# Patient Record
Sex: MALE | Race: WHITE | URBAN - METROPOLITAN AREA
[De-identification: names, ages, dates, MRNs, and addresses within clinical notes are randomized per-mention and may not be internally consistent; named-entity substitution may affect disease eponyms.]

---

## 2021-09-15 ENCOUNTER — HOSPITAL ENCOUNTER (OUTPATIENT)
Dept: OCCUPATIONAL MEDICINE | Age: 41
Discharge: HOME | End: 2021-09-15

## 2021-09-15 ENCOUNTER — TRANSCRIBE ORDER (OUTPATIENT)
Dept: OCCUPATIONAL MEDICINE | Age: 41
End: 2021-09-15

## 2021-09-15 DIAGNOSIS — Z00.8 HEALTH EXAMINATION IN POPULATION SURVEY: ICD-10-CM

## 2021-09-15 DIAGNOSIS — Z00.8 HEALTH EXAMINATION IN POPULATION SURVEY: Primary | ICD-10-CM

## 2022-06-21 ENCOUNTER — OFFICE VISIT (OUTPATIENT)
Dept: FAMILY MEDICINE CLINIC | Facility: CLINIC | Age: 42
End: 2022-06-21
Payer: MEDICARE

## 2022-06-21 VITALS
HEART RATE: 73 BPM | SYSTOLIC BLOOD PRESSURE: 152 MMHG | DIASTOLIC BLOOD PRESSURE: 76 MMHG | BODY MASS INDEX: 42.66 KG/M2 | OXYGEN SATURATION: 96 % | WEIGHT: 315 LBS | TEMPERATURE: 97.5 F | HEIGHT: 72 IN | RESPIRATION RATE: 18 BRPM

## 2022-06-21 DIAGNOSIS — E78.5 HYPERLIPIDEMIA, UNSPECIFIED HYPERLIPIDEMIA TYPE: ICD-10-CM

## 2022-06-21 DIAGNOSIS — E11.69 TYPE 2 DIABETES MELLITUS WITH OTHER SPECIFIED COMPLICATION, WITHOUT LONG-TERM CURRENT USE OF INSULIN (HCC): ICD-10-CM

## 2022-06-21 DIAGNOSIS — I10 HYPERTENSION, UNSPECIFIED TYPE: ICD-10-CM

## 2022-06-21 DIAGNOSIS — F25.0 SCHIZOAFFECTIVE DISORDER, BIPOLAR TYPE (HCC): ICD-10-CM

## 2022-06-21 DIAGNOSIS — G44.209 TENSION-TYPE HEADACHE, NOT INTRACTABLE, UNSPECIFIED CHRONICITY PATTERN: ICD-10-CM

## 2022-06-21 DIAGNOSIS — E03.9 HYPOTHYROIDISM (ACQUIRED): ICD-10-CM

## 2022-06-21 DIAGNOSIS — G47.33 OSA (OBSTRUCTIVE SLEEP APNEA): ICD-10-CM

## 2022-06-21 DIAGNOSIS — E03.9 HYPOTHYROIDISM (ACQUIRED): Primary | ICD-10-CM

## 2022-06-21 DIAGNOSIS — M15.9 OSTEOARTHRITIS OF MULTIPLE JOINTS, UNSPECIFIED OSTEOARTHRITIS TYPE: ICD-10-CM

## 2022-06-21 PROBLEM — M19.90 OA (OSTEOARTHRITIS): Status: ACTIVE | Noted: 2022-06-21

## 2022-06-21 PROBLEM — E11.9 DM2 (DIABETES MELLITUS, TYPE 2) (HCC): Status: ACTIVE | Noted: 2022-06-21

## 2022-06-21 LAB
ALBUMIN SERPL-MCNC: 3.9 G/DL (ref 3.5–5)
ALBUMIN/GLOB SERPL: 1.1 {RATIO} (ref 1.2–3.5)
ALP SERPL-CCNC: 106 U/L (ref 50–136)
ALT SERPL-CCNC: 40 U/L (ref 12–65)
ANION GAP SERPL CALC-SCNC: 7 MMOL/L (ref 7–16)
AST SERPL-CCNC: 18 U/L (ref 15–37)
BILIRUB SERPL-MCNC: 0.5 MG/DL (ref 0.2–1.1)
BUN SERPL-MCNC: 13 MG/DL (ref 6–23)
CALCIUM SERPL-MCNC: 9.6 MG/DL (ref 8.3–10.4)
CHLORIDE SERPL-SCNC: 107 MMOL/L (ref 98–107)
CHOLEST SERPL-MCNC: 151 MG/DL
CO2 SERPL-SCNC: 29 MMOL/L (ref 21–32)
CREAT SERPL-MCNC: 1.1 MG/DL (ref 0.8–1.5)
GLOBULIN SER CALC-MCNC: 3.6 G/DL (ref 2.3–3.5)
GLUCOSE SERPL-MCNC: 98 MG/DL (ref 65–100)
HDLC SERPL-MCNC: 37 MG/DL (ref 40–60)
HDLC SERPL: 4.1 {RATIO}
LDLC SERPL CALC-MCNC: 75.2 MG/DL
POTASSIUM SERPL-SCNC: 4.3 MMOL/L (ref 3.5–5.1)
PROT SERPL-MCNC: 7.5 G/DL (ref 6.3–8.2)
SODIUM SERPL-SCNC: 143 MMOL/L (ref 138–145)
T4 FREE SERPL-MCNC: 0.9 NG/DL (ref 0.78–1.46)
TRIGL SERPL-MCNC: 194 MG/DL (ref 35–150)
TSH, 3RD GENERATION: 4.01 UIU/ML (ref 0.36–3.74)
VLDLC SERPL CALC-MCNC: 38.8 MG/DL (ref 6–23)

## 2022-06-21 PROCEDURE — 99204 OFFICE O/P NEW MOD 45 MIN: CPT | Performed by: FAMILY MEDICINE

## 2022-06-21 PROCEDURE — 2022F DILAT RTA XM EVC RTNOPTHY: CPT | Performed by: FAMILY MEDICINE

## 2022-06-21 PROCEDURE — G8417 CALC BMI ABV UP PARAM F/U: HCPCS | Performed by: FAMILY MEDICINE

## 2022-06-21 PROCEDURE — G8427 DOCREV CUR MEDS BY ELIG CLIN: HCPCS | Performed by: FAMILY MEDICINE

## 2022-06-21 PROCEDURE — 3046F HEMOGLOBIN A1C LEVEL >9.0%: CPT | Performed by: FAMILY MEDICINE

## 2022-06-21 PROCEDURE — 1036F TOBACCO NON-USER: CPT | Performed by: FAMILY MEDICINE

## 2022-06-21 RX ORDER — LISINOPRIL 20 MG/1
20 TABLET ORAL DAILY
Qty: 90 TABLET | Refills: 0 | Status: SHIPPED | OUTPATIENT
Start: 2022-06-21 | End: 2022-09-21 | Stop reason: SDUPTHER

## 2022-06-21 RX ORDER — ATORVASTATIN CALCIUM 40 MG/1
40 TABLET, FILM COATED ORAL NIGHTLY
Qty: 90 TABLET | Refills: 0 | Status: SHIPPED | OUTPATIENT
Start: 2022-06-21 | End: 2022-09-21 | Stop reason: SDUPTHER

## 2022-06-21 RX ORDER — OLANZAPINE 10 MG/1
10 TABLET ORAL NIGHTLY
COMMUNITY

## 2022-06-21 RX ORDER — TIZANIDINE 2 MG/1
2 TABLET ORAL 2 TIMES DAILY
COMMUNITY
End: 2022-06-21 | Stop reason: SDUPTHER

## 2022-06-21 RX ORDER — TIZANIDINE 2 MG/1
2 TABLET ORAL 2 TIMES DAILY
Qty: 180 TABLET | Refills: 0 | Status: SHIPPED | OUTPATIENT
Start: 2022-06-21 | End: 2022-09-21 | Stop reason: SDUPTHER

## 2022-06-21 RX ORDER — LEVOTHYROXINE SODIUM 0.05 MG/1
50 TABLET ORAL DAILY
COMMUNITY
End: 2022-06-21 | Stop reason: SDUPTHER

## 2022-06-21 RX ORDER — IBUPROFEN 800 MG/1
800 TABLET ORAL 3 TIMES DAILY
COMMUNITY
End: 2022-11-02

## 2022-06-21 RX ORDER — LEVOTHYROXINE SODIUM 0.05 MG/1
50 TABLET ORAL DAILY
Qty: 90 TABLET | Refills: 0 | Status: SHIPPED | OUTPATIENT
Start: 2022-06-21 | End: 2022-09-21 | Stop reason: SDUPTHER

## 2022-06-21 RX ORDER — AMLODIPINE BESYLATE 10 MG/1
10 TABLET ORAL DAILY
COMMUNITY
End: 2022-06-21 | Stop reason: SDUPTHER

## 2022-06-21 RX ORDER — ATORVASTATIN CALCIUM 80 MG/1
80 TABLET, FILM COATED ORAL DAILY
COMMUNITY
End: 2022-06-21 | Stop reason: SDUPTHER

## 2022-06-21 RX ORDER — CITALOPRAM 40 MG/1
40 TABLET ORAL DAILY
COMMUNITY

## 2022-06-21 RX ORDER — LORATADINE 10 MG/1
10 TABLET ORAL DAILY
COMMUNITY

## 2022-06-21 RX ORDER — LITHIUM CARBONATE 300 MG/1
600 CAPSULE ORAL 2 TIMES DAILY WITH MEALS
COMMUNITY

## 2022-06-21 RX ORDER — AMLODIPINE BESYLATE 10 MG/1
10 TABLET ORAL DAILY
Qty: 90 TABLET | Refills: 0 | Status: SHIPPED | OUTPATIENT
Start: 2022-06-21 | End: 2022-09-21 | Stop reason: SDUPTHER

## 2022-06-21 RX ORDER — LISINOPRIL 20 MG/1
20 TABLET ORAL DAILY
COMMUNITY
End: 2022-06-21 | Stop reason: SDUPTHER

## 2022-06-21 NOTE — ASSESSMENT & PLAN NOTE
Problem and/or Symptoms are currently not stable and/or well controlled on current treatment plan. Will have patient follow up as directed and make the following changes for further evaluation and/or treatment:     Discussed with patient proper diet and exercise to include: correct percentage of protein, fat, and carbohydrates for each meal with the focus on a low carbohydrate diet; also regular cardiovascular and strength training exercise most days of the week. All questions answered and will follow up weight and adherence to lifestyle modifications at next visit.     Also referring for diabetic nutritional counseling

## 2022-06-21 NOTE — PROGRESS NOTES
[address]  Phone: [phone]  Fax: [phone]  [email or web address]      Encounter Info  Tess Pinto; New patient 35 y.o.male; seen 6/21/2022 for: Establish Care, Hypertension, Cholesterol Problem, Hypothyroidism, Diabetes, Headache (tension headaches takes tizanidine), and Sleep Apnea (needs new machine)      Assessment & Plan    1. Hypothyroidism (acquired)  Assessment & Plan:  Problem and/or Symptoms are new to provider. Will have patient follow up as directed and make the following plan for further evaluation and/or treatment:    Pertinent labs pending & pertinent meds refilled X 3 M    Orders:  -     TSH; Future  -     T4, Free; Future  2. Type 2 diabetes mellitus with other specified complication, without long-term current use of insulin (HCC)  Assessment & Plan:  Problem and/or Symptoms are new to provider. Will have patient follow up as directed and make the following plan for further evaluation and/or treatment:    Pertinent labs pending & pertinent meds refilled X 3 M  Orders:  -     metFORMIN (GLUCOPHAGE) 500 MG tablet; Take 1 tablet by mouth daily (with breakfast), Disp-180 tablet, R-0Normal  -     Hemoglobin A1C; Future  -     Comprehensive Metabolic Panel; Future  -     [facility]  3. Tension-type headache, not intractable, unspecified chronicity pattern  Assessment & Plan:  Problem and/or Symptoms are new to provider. Will have patient follow up as directed and make the following plan for further evaluation and/or treatment:    Pertinent labs pending & pertinent meds refilled X 3 M    Orders:  -     tiZANidine (ZANAFLEX) 2 MG tablet; Take 1 tablet by mouth 2 times daily, Disp-180 tablet, R-0Normal  4. Osteoarthritis of multiple joints, unspecified osteoarthritis type  Assessment & Plan:  Problem and/or Symptoms are new to provider.  Will have patient follow up as directed and make the following plan for further evaluation and/or treatment:    Pertinent labs pending & pertinent meds refilled X 3 M    5. Hyperlipidemia, unspecified hyperlipidemia type  Assessment & Plan:  Problem and/or Symptoms are new to provider. Will have patient follow up as directed and make the following plan for further evaluation and/or treatment:    Pertinent labs pending & pertinent meds refilled X 3 M    Orders:  -     atorvastatin (LIPITOR) 40 MG tablet; Take 1 tablet by mouth at bedtime, Disp-90 tablet, R-0Normal  -     Comprehensive Metabolic Panel; Future  -     Lipid Panel; Future  6. Schizoaffective disorder, bipolar type [facility])  Assessment & Plan:  Problem is managed by appropriate specialist and patient encouraged to attend all scheduled visits and take all medications as directed. 7. Hypertension, unspecified type  Assessment & Plan:  Problem and/or Symptoms are new to provider. Will have patient follow up as directed and make the following plan for further evaluation and/or treatment:    Problem and/or Symptoms are currently not stable and/or well controlled on current treatment plan. Will have patient follow up as directed and make the following changes for further evaluation and/or treatment:     Advised patient to check home BP BID X 2 wks, then send BP log or average reading to office for review. Will make adjustments to BP Tx regimen then as appropriate. Orders:  -     amLODIPine (NORVASC) 10 MG tablet; Take 1 tablet by mouth daily, Disp-90 tablet, R-0Normal  -     metoprolol tartrate (LOPRESSOR) 25 MG tablet; Take 1 tablet by mouth 2 times daily, Disp-180 tablet, R-0Normal  -     lisinopril (PRINIVIL;ZESTRIL) 20 MG tablet; Take 1 tablet by mouth daily, Disp-90 tablet, R-0Normal  -     Comprehensive Metabolic Panel; Future  8. YENI (obstructive sleep apnea)  Assessment & Plan:  Problem and/or Symptoms are currently not stable and/or well controlled on current treatment plan.  Will have patient follow up as directed and make the following changes for further evaluation and/or treatment:     Referring to Sleep Medicine clinic as pt states it's been well over 10 years since he's had a sleep study or had his machine settings checked  Orders:  -     Amb External Referral To Sleep Medicine  9. BMI 60.0-69.9, adult [facility])  Assessment & Plan:  Problem and/or Symptoms are currently not stable and/or well controlled on current treatment plan. Will have patient follow up as directed and make the following changes for further evaluation and/or treatment:     Discussed with patient proper diet and exercise to include: correct percentage of protein, fat, and carbohydrates for each meal with the focus on a low carbohydrate diet; also regular cardiovascular and strength training exercise most days of the week. All questions answered and will follow up weight and adherence to lifestyle modifications at next visit. Also referring for diabetic nutritional counseling  Orders:  -     [facility]      Check Out Instructions  Return in about 3 months (around 9/21/2022) for Physical, Previsit Labs. Subjective & Objective    HPI  New pt today, sees the [facility] for Psych care but has not really had any primary care for about 3 years. [facility] has [facility] RF his medical problem medications but pt states she's not had any labs for 3+ years. Review of Systems  Physical Exam  Constitutional:       Appearance: He is obese. Cardiovascular:      Rate and Rhythm: Normal rate and regular rhythm. Pulses: Normal pulses. Heart sounds: Normal heart sounds. Pulmonary:      Effort: Pulmonary effort is normal.      Breath sounds: Normal breath sounds.        Vitals:    06/21/22 1408 06/21/22 1413   BP: (!) 168/80 (!) 152/76   Site: Left Lower Arm Right Lower Arm   Position: Sitting Sitting   Cuff Size: Large Adult Large Adult   Pulse: 73 73   Resp: 18    Temp: 97.5 °F (36.4 °C)    TempSrc: Temporal SpO2: 96%    Weight: (!) 510 lb (231.3 kg)    Height: 6' (1.829 m)      BP Readings from Last 3 Encounters:   06/21/22 (!) 152/76     Body mass index is 69.17 kg/m². Wt Readings from Last 3 Encounters:   06/21/22 (!) 510 lb (231.3 kg)     TIME    We discussed the typical prognosis and potential complications of the concern(s), including treatment options. Medication risks, benefits, costs, interactions, and alternatives were discussed as appropriate. I advised him to contact the office if his condition worsens or fails to improve as anticipated. He expressed understanding with the discussion and plan of care. An electronic signature was used to authenticate this note.   -- Lacie Nowak MD

## 2022-06-21 NOTE — ASSESSMENT & PLAN NOTE
Problem and/or Symptoms are currently not stable and/or well controlled on current treatment plan.  Will have patient follow up as directed and make the following changes for further evaluation and/or treatment:     Referring to Sleep Medicine clinic as pt states it's been well over 10 years since he's had a sleep study or had his machine settings checked

## 2022-06-21 NOTE — ASSESSMENT & PLAN NOTE
Problem and/or Symptoms are new to provider.  Will have patient follow up as directed and make the following plan for further evaluation and/or treatment:    Pertinent labs pending & pertinent meds refilled X 3 M

## 2022-06-21 NOTE — ASSESSMENT & PLAN NOTE
Problem and/or Symptoms are new to provider. Will have patient follow up as directed and make the following plan for further evaluation and/or treatment:    Problem and/or Symptoms are currently not stable and/or well controlled on current treatment plan. Will have patient follow up as directed and make the following changes for further evaluation and/or treatment:     Advised patient to check home BP BID X 2 wks, then send BP log or average reading to office for review. Will make adjustments to BP Tx regimen then as appropriate.

## 2022-06-22 LAB
EST. AVERAGE GLUCOSE BLD GHB EST-MCNC: 126 MG/DL
HBA1C MFR BLD: 6 % (ref 4.2–6.3)

## 2022-09-09 DIAGNOSIS — I10 HYPERTENSION, UNSPECIFIED TYPE: ICD-10-CM

## 2022-09-09 DIAGNOSIS — E03.9 HYPOTHYROIDISM (ACQUIRED): ICD-10-CM

## 2022-09-09 DIAGNOSIS — E11.69 TYPE 2 DIABETES MELLITUS WITH OTHER SPECIFIED COMPLICATION, WITHOUT LONG-TERM CURRENT USE OF INSULIN (HCC): ICD-10-CM

## 2022-09-09 DIAGNOSIS — E78.5 HYPERLIPIDEMIA, UNSPECIFIED HYPERLIPIDEMIA TYPE: ICD-10-CM

## 2022-09-15 LAB
ALBUMIN SERPL-MCNC: 3.8 G/DL (ref 3.5–5)
ALBUMIN/GLOB SERPL: 1.2 {RATIO} (ref 1.2–3.5)
ALP SERPL-CCNC: 100 U/L (ref 50–136)
ALT SERPL-CCNC: 30 U/L (ref 12–65)
ANION GAP SERPL CALC-SCNC: 2 MMOL/L (ref 4–13)
AST SERPL-CCNC: 13 U/L (ref 15–37)
BILIRUB SERPL-MCNC: 0.6 MG/DL (ref 0.2–1.1)
BUN SERPL-MCNC: 19 MG/DL (ref 6–23)
CALCIUM SERPL-MCNC: 9.8 MG/DL (ref 8.3–10.4)
CHLORIDE SERPL-SCNC: 109 MMOL/L (ref 101–110)
CHOLEST SERPL-MCNC: 141 MG/DL
CO2 SERPL-SCNC: 28 MMOL/L (ref 21–32)
CREAT SERPL-MCNC: 1.2 MG/DL (ref 0.8–1.5)
EST. AVERAGE GLUCOSE BLD GHB EST-MCNC: 117 MG/DL
GLOBULIN SER CALC-MCNC: 3.2 G/DL (ref 2.3–3.5)
GLUCOSE SERPL-MCNC: 152 MG/DL (ref 65–100)
HBA1C MFR BLD: 5.7 % (ref 4.8–5.6)
HDLC SERPL-MCNC: 33 MG/DL (ref 40–60)
HDLC SERPL: 4.3 {RATIO}
LDLC SERPL CALC-MCNC: 75.4 MG/DL
POTASSIUM SERPL-SCNC: 4.4 MMOL/L (ref 3.5–5.1)
PROT SERPL-MCNC: 7 G/DL (ref 6.3–8.2)
SODIUM SERPL-SCNC: 139 MMOL/L (ref 136–145)
T4 FREE SERPL-MCNC: 0.8 NG/DL (ref 0.78–1.46)
TRIGL SERPL-MCNC: 163 MG/DL (ref 35–150)
TSH, 3RD GENERATION: 3.84 UIU/ML (ref 0.36–3.74)
VLDLC SERPL CALC-MCNC: 32.6 MG/DL (ref 6–23)

## 2022-09-21 ENCOUNTER — OFFICE VISIT (OUTPATIENT)
Dept: FAMILY MEDICINE CLINIC | Facility: CLINIC | Age: 42
End: 2022-09-21
Payer: MEDICARE

## 2022-09-21 VITALS
BODY MASS INDEX: 42.66 KG/M2 | TEMPERATURE: 97.5 F | WEIGHT: 315 LBS | SYSTOLIC BLOOD PRESSURE: 153 MMHG | OXYGEN SATURATION: 95 % | HEIGHT: 72 IN | HEART RATE: 86 BPM | DIASTOLIC BLOOD PRESSURE: 68 MMHG

## 2022-09-21 DIAGNOSIS — G44.209 TENSION-TYPE HEADACHE, NOT INTRACTABLE, UNSPECIFIED CHRONICITY PATTERN: ICD-10-CM

## 2022-09-21 DIAGNOSIS — I10 HYPERTENSION, UNSPECIFIED TYPE: ICD-10-CM

## 2022-09-21 DIAGNOSIS — F25.0 SCHIZOAFFECTIVE DISORDER, BIPOLAR TYPE (HCC): ICD-10-CM

## 2022-09-21 DIAGNOSIS — E03.9 HYPOTHYROIDISM (ACQUIRED): Primary | ICD-10-CM

## 2022-09-21 DIAGNOSIS — E78.5 HYPERLIPIDEMIA, UNSPECIFIED HYPERLIPIDEMIA TYPE: ICD-10-CM

## 2022-09-21 DIAGNOSIS — E11.69 TYPE 2 DIABETES MELLITUS WITH OTHER SPECIFIED COMPLICATION, WITHOUT LONG-TERM CURRENT USE OF INSULIN (HCC): ICD-10-CM

## 2022-09-21 PROCEDURE — 99214 OFFICE O/P EST MOD 30 MIN: CPT | Performed by: FAMILY MEDICINE

## 2022-09-21 PROCEDURE — G8417 CALC BMI ABV UP PARAM F/U: HCPCS | Performed by: FAMILY MEDICINE

## 2022-09-21 PROCEDURE — G8427 DOCREV CUR MEDS BY ELIG CLIN: HCPCS | Performed by: FAMILY MEDICINE

## 2022-09-21 PROCEDURE — 2022F DILAT RTA XM EVC RTNOPTHY: CPT | Performed by: FAMILY MEDICINE

## 2022-09-21 PROCEDURE — 3044F HG A1C LEVEL LT 7.0%: CPT | Performed by: FAMILY MEDICINE

## 2022-09-21 PROCEDURE — 1036F TOBACCO NON-USER: CPT | Performed by: FAMILY MEDICINE

## 2022-09-21 RX ORDER — TIZANIDINE 2 MG/1
2 TABLET ORAL 2 TIMES DAILY
Qty: 180 TABLET | Refills: 1 | Status: SHIPPED | OUTPATIENT
Start: 2022-09-21

## 2022-09-21 RX ORDER — LISINOPRIL 40 MG/1
40 TABLET ORAL DAILY
Qty: 90 TABLET | Refills: 1 | Status: SHIPPED | OUTPATIENT
Start: 2022-09-21 | End: 2022-11-02

## 2022-09-21 RX ORDER — LEVOTHYROXINE SODIUM 0.07 MG/1
75 TABLET ORAL DAILY
Qty: 90 TABLET | Refills: 1 | Status: SHIPPED | OUTPATIENT
Start: 2022-09-21

## 2022-09-21 RX ORDER — AMLODIPINE BESYLATE 10 MG/1
10 TABLET ORAL DAILY
Qty: 90 TABLET | Refills: 1 | Status: SHIPPED | OUTPATIENT
Start: 2022-09-21

## 2022-09-21 RX ORDER — ATORVASTATIN CALCIUM 40 MG/1
40 TABLET, FILM COATED ORAL NIGHTLY
Qty: 90 TABLET | Refills: 1 | Status: SHIPPED | OUTPATIENT
Start: 2022-09-21

## 2022-09-21 NOTE — PROGRESS NOTES
[facility])  Assessment & Plan:  Problem and/or Symptoms are currently stable and/or improving on current treatment plan. Will continue and have patient follow up as directed. Pertinent labs reviewed & pertinent meds refilled X 6 M  Orders:  -     metFORMIN (GLUCOPHAGE) 500 MG tablet; Take 1 tablet by mouth daily (with breakfast), Disp-90 tablet, R-1Normal  5. Hyperlipidemia, unspecified hyperlipidemia type  Assessment & Plan:  Problem and/or Symptoms are currently stable and/or improving on current treatment plan. Will continue and have patient follow up as directed. Pertinent labs reviewed & pertinent meds refilled X 6 M    Orders:  -     atorvastatin (LIPITOR) 40 MG tablet; Take 1 tablet by mouth at bedtime, Disp-90 tablet, R-1Normal  6. BMI 60.0-69.9, adult [facility])  Assessment & Plan:  Discussed with patient proper diet and exercise to include: correct percentage of protein, fat, and carbohydrates for each meal with the focus on a low carbohydrate diet; also regular cardiovascular and strength training exercise most days of the week. All questions answered and will follow up weight and adherence to lifestyle modifications at next visit. 7. Schizoaffective disorder, bipolar type [facility])  Assessment & Plan:  Problem is managed by appropriate specialist and patient encouraged to attend all scheduled visits and take all medications as directed. Check Out Instructions  Return in about 6 months (around 3/21/2023) for Office Visit, Previsit Labs. Subjective & Objective    HPI  Patient states that chronic health problems are stable on current regimens and has no acute concerns today except as mentioned.       Review of Systems    Physical Exam  Vitals:    09/21/22 1349 09/21/22 1352   BP: (!) 131/57 (!) 153/68   Site: Left Lower Arm Right Lower Arm   Position: Sitting Sitting   Cuff Size: Large Adult Large Adult   Pulse: 86    Temp: 97.5 °F (36.4 °C)    TempSrc: Temporal    SpO2: 95%    Weight: (!) 506 lb (229.5 kg)    Height: 6' (1.829 m)      BP Readings from Last 3 Encounters:   09/21/22 (!) 153/68   06/21/22 (!) 152/76     Body mass index is 68.63 kg/m². Wt Readings from Last 3 Encounters:   09/21/22 (!) 506 lb (229.5 kg)   06/21/22 (!) 510 lb (231.3 kg)       We discussed the typical prognosis and potential complications of the concern(s), including treatment options. Medication risks, benefits, costs, interactions, and alternatives were discussed as appropriate. I advised him to contact the office if his condition worsens or fails to improve as anticipated. He expressed understanding with the discussion and plan of care. An electronic signature was used to authenticate this note.   -- Haley Yoo MD

## 2022-09-21 NOTE — ASSESSMENT & PLAN NOTE
Problem and/or Symptoms are currently not stable and/or well controlled on current treatment plan.  Will have patient follow up as directed and make the following changes for further evaluation and/or treatment:     Cont norvasc 10 mg QD & increasing Lisinopril from 20 to 40 mg QD

## 2022-09-21 NOTE — ASSESSMENT & PLAN NOTE
Discussed with patient proper diet and exercise to include: correct percentage of protein, fat, and carbohydrates for each meal with the focus on a low carbohydrate diet; also regular cardiovascular and strength training exercise most days of the week. All questions answered and will follow up weight and adherence to lifestyle modifications at next visit.

## 2022-10-22 ENCOUNTER — PATIENT MESSAGE (OUTPATIENT)
Dept: FAMILY MEDICINE CLINIC | Facility: CLINIC | Age: 42
End: 2022-10-22

## 2022-10-24 NOTE — TELEPHONE ENCOUNTER
From: Jono Cagle  To: Dr. Dolores Mcknight: 10/22/2022 4:56 PM EDT  Subject: Sleep apnea     Hi I was treated at [facility] sleep clinic do get a new machine.  I never heard back from them and would like you to refer me to a different sleep clinic

## 2022-11-02 ENCOUNTER — HOSPITAL ENCOUNTER (EMERGENCY)
Age: 42
Discharge: HOME | End: 2022-11-03
Attending: EMERGENCY MEDICINE
Payer: MEDICARE

## 2022-11-02 DIAGNOSIS — N28.9 RENAL INSUFFICIENCY: Primary | ICD-10-CM

## 2022-11-02 LAB
ALBUMIN SERPL-MCNC: 3.7 G/DL (ref 3.5–5)
ALBUMIN/GLOB SERPL: 1.1 {RATIO} (ref 0.4–1.6)
ALP SERPL-CCNC: 105 U/L (ref 50–136)
ALT SERPL-CCNC: 40 U/L (ref 12–65)
ANION GAP SERPL CALC-SCNC: 5 MMOL/L (ref 2–11)
AST SERPL-CCNC: 14 U/L (ref 15–37)
BASOPHILS # BLD: 0.1 K/UL (ref 0–0.2)
BASOPHILS NFR BLD: 1 % (ref 0–2)
BILIRUB SERPL-MCNC: 0.5 MG/DL (ref 0.2–1.1)
BILIRUB UR QL: NEGATIVE
BUN SERPL-MCNC: 41 MG/DL (ref 6–23)
CALCIUM SERPL-MCNC: 9.5 MG/DL (ref 8.3–10.4)
CHLORIDE SERPL-SCNC: 113 MMOL/L (ref 101–110)
CO2 SERPL-SCNC: 22 MMOL/L (ref 21–32)
CREAT SERPL-MCNC: 2.2 MG/DL (ref 0.8–1.5)
DIFFERENTIAL METHOD BLD: ABNORMAL
EOSINOPHIL # BLD: 0.4 K/UL (ref 0–0.8)
EOSINOPHIL NFR BLD: 3 % (ref 0.5–7.8)
ERYTHROCYTE [DISTWIDTH] IN BLOOD BY AUTOMATED COUNT: 14.7 % (ref 11.9–14.6)
GLOBULIN SER CALC-MCNC: 3.5 G/DL (ref 2.8–4.5)
GLUCOSE SERPL-MCNC: 111 MG/DL (ref 65–100)
GLUCOSE UR QL STRIP.AUTO: NEGATIVE MG/DL
HCT VFR BLD AUTO: 36.8 % (ref 41.1–50.3)
HGB BLD-MCNC: 11.4 G/DL (ref 13.6–17.2)
IMM GRANULOCYTES # BLD AUTO: 0 K/UL (ref 0–0.5)
IMM GRANULOCYTES NFR BLD AUTO: 0 % (ref 0–5)
KETONES UR-MCNC: NEGATIVE MG/DL
LEUKOCYTE ESTERASE UR QL STRIP: NEGATIVE
LYMPHOCYTES # BLD: 3 K/UL (ref 0.5–4.6)
LYMPHOCYTES NFR BLD: 22 % (ref 13–44)
MCH RBC QN AUTO: 29.8 PG (ref 26.1–32.9)
MCHC RBC AUTO-ENTMCNC: 31 G/DL (ref 31.4–35)
MCV RBC AUTO: 96.1 FL (ref 82–102)
MONOCYTES # BLD: 1.1 K/UL (ref 0.1–1.3)
MONOCYTES NFR BLD: 8 % (ref 4–12)
NEUTS SEG # BLD: 9.3 K/UL (ref 1.7–8.2)
NEUTS SEG NFR BLD: 66 % (ref 43–78)
NITRITE UR QL: NEGATIVE
NRBC # BLD: 0 K/UL (ref 0–0.2)
PH UR: 6 [PH] (ref 5–9)
PLATELET # BLD AUTO: 292 K/UL (ref 150–450)
PMV BLD AUTO: 11.4 FL (ref 9.4–12.3)
POTASSIUM SERPL-SCNC: 5.2 MMOL/L (ref 3.5–5.1)
PROT SERPL-MCNC: 7.2 G/DL (ref 6.3–8.2)
PROT UR QL: NEGATIVE MG/DL
RBC # BLD AUTO: 3.83 M/UL (ref 4.23–5.6)
RBC # UR STRIP: NEGATIVE /UL
SERVICE CMNT-IMP: ABNORMAL
SODIUM SERPL-SCNC: 140 MMOL/L (ref 133–143)
SP GR UR: >1.03 (ref 1–1.02)
UROBILINOGEN UR QL: 0.2 EU/DL (ref 0.2–1)
WBC # BLD AUTO: 13.9 K/UL (ref 4.3–11.1)

## 2022-11-02 PROCEDURE — 96360 HYDRATION IV INFUSION INIT: CPT

## 2022-11-02 PROCEDURE — 80053 COMPREHEN METABOLIC PANEL: CPT

## 2022-11-02 PROCEDURE — 85025 COMPLETE CBC W/AUTO DIFF WBC: CPT

## 2022-11-02 PROCEDURE — 80178 ASSAY OF LITHIUM: CPT

## 2022-11-02 PROCEDURE — 96361 HYDRATE IV INFUSION ADD-ON: CPT

## 2022-11-02 PROCEDURE — 99284 EMERGENCY DEPT VISIT MOD MDM: CPT

## 2022-11-02 PROCEDURE — 81003 URINALYSIS AUTO W/O SCOPE: CPT

## 2022-11-02 PROCEDURE — 2580000003 HC RX 258: Performed by: EMERGENCY MEDICINE

## 2022-11-02 RX ORDER — 0.9 % SODIUM CHLORIDE 0.9 %
1000 INTRAVENOUS SOLUTION INTRAVENOUS ONCE
Status: COMPLETED | OUTPATIENT
Start: 2022-11-02 | End: 2022-11-03

## 2022-11-02 RX ADMIN — SODIUM CHLORIDE 1000 ML: 900 INJECTION, SOLUTION INTRAVENOUS at 20:57

## 2022-11-02 ASSESSMENT — ENCOUNTER SYMPTOMS
SHORTNESS OF BREATH: 0
NAUSEA: 0
ABDOMINAL PAIN: 0
VOMITING: 0
COUGH: 0
DIARRHEA: 0
BACK PAIN: 0

## 2022-11-02 ASSESSMENT — PAIN - FUNCTIONAL ASSESSMENT: PAIN_FUNCTIONAL_ASSESSMENT: NONE - DENIES PAIN

## 2022-11-02 NOTE — ED TRIAGE NOTES
Pt arrives from home via POV. Was seen at [place] and labs were drawn. Got a call and advised to come to ED for evaluation of Kidney Function. Creatnine 2.7. Pt has had 3-4 UTIs in the last six weeks.   Denies abdominal pain, n/v/d

## 2022-11-03 VITALS
HEIGHT: 72 IN | RESPIRATION RATE: 20 BRPM | BODY MASS INDEX: 42.66 KG/M2 | SYSTOLIC BLOOD PRESSURE: 163 MMHG | OXYGEN SATURATION: 96 % | HEART RATE: 87 BPM | DIASTOLIC BLOOD PRESSURE: 59 MMHG | TEMPERATURE: 98.7 F | WEIGHT: 315 LBS

## 2022-11-03 LAB — LITHIUM SERPL-SCNC: 1 MMOL/L (ref 0.6–1.2)

## 2022-11-03 ASSESSMENT — PAIN - FUNCTIONAL ASSESSMENT: PAIN_FUNCTIONAL_ASSESSMENT: NONE - DENIES PAIN

## 2022-11-03 NOTE — ED NOTES
I have reviewed discharge instructions with the pt and pt family member at bedside. The pt and pt family member verbalized understanding. Patient left ED via POV to home with family member. Opportunity for questions and clarification provided. Patient given 0 scripts. To continue your aftercare when you leave the hospital, you may receive an automated call from our care team to check in on how you are doing. This is a free service and part of our promise to provide the best care and service to meet your aftercare needs.  If you have questions, or wish to unsubscribe from this service please call [phone]. Thank you for Choosing our [employer] Emergency Department.         Lucy Simmons RN  11/03/22 5869

## 2022-11-03 NOTE — DISCHARGE INSTRUCTIONS
You must follow-up with nephrology for further evaluation as soon as possible. Avoiding medications that are toxic to the kidneys such as NSAIDs. Talk to your psychiatrist immediately about reducing your lithium dose due to your abnormal kidney function. Return for any worsening or concerning symptoms.

## 2022-11-03 NOTE — ED PROVIDER NOTES
[facility] Emergency Department Provider Note                   PCP:                David Lay MD               Age: 35 y.o. Sex: male       Angela Domingo is a 35 y.o. male who presents to the Emergency Department with chief complaint of    Chief Complaint   Patient presents with    Abnormal Lab      Patient states that last week he had labs drawn and his creatinine was elevated. It was rechecked today by the [place] and was told his creatinine was 2.7. Patient denies any history of kidney problem. Patient states that he has been urinating normally. He states that he feels well otherwise. He states he had 3-4 urinary tract infections over the last 6 weeks but no UTI symptoms now. The history is provided by the patient. All other systems reviewed and are negative. Review of Systems   Constitutional:  Negative for chills, fatigue and fever. Eyes:  Negative for visual disturbance. Respiratory:  Negative for cough and shortness of breath. Cardiovascular:  Negative for chest pain and leg swelling. Gastrointestinal:  Negative for abdominal pain, diarrhea, nausea and vomiting. Genitourinary:  Negative for decreased urine volume, difficulty urinating, dysuria and hematuria. Musculoskeletal:  Negative for back pain. Neurological:  Negative for dizziness and headaches.      Past Medical History:   Diagnosis Date    Diabetes mellitus ([address]     Hyperlipidemia     Hypertension     Schizo-affective psychosis [address] [address]     with major depression    Thyroid disease         Past Surgical History:   Procedure Laterality Date    ANKLE SURGERY Left 2014    4 left ankle surgeries, ankle fusion        Family History   Problem Relation Age of Onset    High Blood Pressure Mother     Alcohol Abuse Father         Social History     Socioeconomic History    Marital status: Single     Spouse name: None    Number of children: None    Years of education: None    Highest education level: None   Tobacco Use Smoking status: Former     Years: 15.00     Types: Cigarettes    Smokeless tobacco: Never   Vaping Use    Vaping Use: Never used   Substance and Sexual Activity    Alcohol use: Not Currently    Drug use: Never        Allergies: Patient has no known allergies.     Current Discharge Medication List        CONTINUE these medications which have NOT CHANGED    Details   NALTREXONE-BUPROPION HCL ER PO Take by mouth in the morning, at noon, and at bedtime Unknown mg      metoprolol tartrate (LOPRESSOR) 25 MG tablet Take 1 tablet by mouth 2 times daily  Qty: 180 tablet, Refills: 1    Associated Diagnoses: Hypertension, unspecified type      metFORMIN (GLUCOPHAGE) 500 MG tablet Take 1 tablet by mouth daily (with breakfast)  Qty: 90 tablet, Refills: 1    Associated Diagnoses: Type 2 diabetes mellitus with other specified complication, without long-term current use of insulin (HCC)      levothyroxine (SYNTHROID) 75 MCG tablet Take 1 tablet by mouth daily  Qty: 90 tablet, Refills: 1    Associated Diagnoses: Hypothyroidism (acquired)      atorvastatin (LIPITOR) 40 MG tablet Take 1 tablet by mouth at bedtime  Qty: 90 tablet, Refills: 1    Associated Diagnoses: Hyperlipidemia, unspecified hyperlipidemia type      amLODIPine (NORVASC) 10 MG tablet Take 1 tablet by mouth daily  Qty: 90 tablet, Refills: 1    Associated Diagnoses: Hypertension, unspecified type      tiZANidine (ZANAFLEX) 2 MG tablet Take 1 tablet by mouth 2 times daily  Qty: 180 tablet, Refills: 1    Associated Diagnoses: Tension-type headache, not intractable, unspecified chronicity pattern      OLANZapine (ZYPREXA) 10 MG tablet Take 10 mg by mouth nightly      Acetaminophen 500 MG CAPS Take 1,500 mg by mouth      citalopram (CELEXA) 40 MG tablet Take 40 mg by mouth daily      loratadine (CLARITIN) 10 MG tablet Take 10 mg by mouth daily      lithium 300 MG capsule Take 600 mg by mouth 2 times daily (with meals) Take two capsules two times a day              Vitals signs and nursing note reviewed. No data found. Physical Exam  Vitals and nursing note reviewed. Constitutional:       Appearance: He is obese. HENT:      Head: Normocephalic and atraumatic. Cardiovascular:      Rate and Rhythm: Normal rate and regular rhythm. Pulses: Normal pulses. Heart sounds: Normal heart sounds. Pulmonary:      Effort: Pulmonary effort is normal.      Breath sounds: Normal breath sounds. Abdominal:      General: Abdomen is flat. Bowel sounds are normal.      Palpations: Abdomen is soft. Tenderness: There is no abdominal tenderness. Musculoskeletal:         General: No swelling or tenderness. Cervical back: Normal range of motion and neck supple. No tenderness. Skin:     General: Skin is warm and dry. Neurological:      General: No focal deficit present. Mental Status: He is alert and oriented to person, place, and time.    Psychiatric:         Behavior: Behavior normal.        Orders Placed This Encounter   Procedures    CBC with Auto Differential    Comprehensive Metabolic Panel    Lithium Level    POCT Urinalysis no Micro    POCT Urinalysis no Micro       Procedures        Results Include:    Recent Results (from the past 24 hour(s))   CBC with Auto Differential    Collection Time: 11/02/22  3:58 PM   Result Value Ref Range    WBC 13.9 (H) 4.3 - 11.1 K/uL    RBC 3.83 (L) 4.23 - 5.6 M/uL    Hemoglobin 11.4 (L) 13.6 - 17.2 g/dL    Hematocrit 36.8 (L) 41.1 - 50.3 %    MCV 96.1 82 - 102 FL    MCH 29.8 26.1 - 32.9 PG    MCHC 31.0 (L) 31.4 - 35.0 g/dL    RDW 14.7 (H) 11.9 - 14.6 %    Platelets 294 397 - 179 K/uL    MPV 11.4 9.4 - 12.3 FL    nRBC 0.00 0.0 - 0.2 K/uL    Differential Type AUTOMATED      Seg Neutrophils 66 43 - 78 %    Lymphocytes 22 13 - 44 %    Monocytes 8 4.0 - 12.0 %    Eosinophils % 3 0.5 - 7.8 %    Basophils 1 0.0 - 2.0 %    Immature Granulocytes 0 0.0 - 5.0 %    Segs Absolute 9.3 (H) 1.7 - 8.2 K/UL    Absolute Lymph # 3.0 0.5 - 4.6 K/UL    Absolute Mono # 1.1 0.1 - 1.3 K/UL    Absolute Eos # 0.4 0.0 - 0.8 K/UL    Basophils Absolute 0.1 0.0 - 0.2 K/UL    Absolute Immature Granulocyte 0.0 0.0 - 0.5 K/UL   Comprehensive Metabolic Panel    Collection Time: 11/02/22  3:58 PM   Result Value Ref Range    Sodium 140 133 - 143 mmol/L    Potassium 5.2 (H) 3.5 - 5.1 mmol/L    Chloride 113 (H) 101 - 110 mmol/L    CO2 22 21 - 32 mmol/L    Anion Gap 5 2 - 11 mmol/L    Glucose 111 (H) 65 - 100 mg/dL    BUN 41 (H) 6 - 23 MG/DL    Creatinine 2.20 (H) 0.8 - 1.5 MG/DL    Est, Glom Filt Rate 37 (L) >60 ml/min/1.73m2    Calcium 9.5 8.3 - 10.4 MG/DL    Total Bilirubin 0.5 0.2 - 1.1 MG/DL    ALT 40 12 - 65 U/L    AST 14 (L) 15 - 37 U/L    Alk Phosphatase 105 50 - 136 U/L    Total Protein 7.2 6.3 - 8.2 g/dL    Albumin 3.7 3.5 - 5.0 g/dL    Globulin 3.5 2.8 - 4.5 g/dL    Albumin/Globulin Ratio 1.1 0.4 - 1.6     POCT Urinalysis no Micro    Collection Time: 11/02/22  7:52 PM   Result Value Ref Range    Specific Gravity, Urine, POC >1.030 (H) 1.001 - 1.023    pH, Urine, POC 6.0 5.0 - 9.0      Protein, Urine, POC Negative NEG mg/dL    Glucose, UA POC Negative NEG mg/dL    Ketones, Urine, POC Negative NEG mg/dL    Bilirubin, Urine, POC Negative NEG      Blood, UA POC Negative NEG      URINE UROBILINOGEN POC 0.2 0.2 - 1.0 EU/dL    Nitrate, Urine, POC Negative NEG      Leukocyte Est, UA POC Negative NEG      Performed by: Paola Dotson           No orders to display                      ED Course as of 11/02/22 2324 Wed Nov 02, 2022 2143 Timo Monaco of hospitalist.  She recommended that patient can follow-up with nephrology as outpatient. She did recommend patient's stop any ACE or NSAID medications he is on. [CW]   2322 Nursing has had to redraw patient's lithium level because apparently the lab has the wrong tube. I did update the patient on the results and plan.  [CW]      ED Course User Index  [CW] Felicitas Stephens MD       MDM  Number of Diagnoses or Management Options  Renal insufficiency  Diagnosis management comments: Patient presented for evaluation of elevated kidney enzymes. Patient states that he has been urinating normally recently. Patient's BUN and creatinine were mildly elevated today in the ED. Patient's potassium is barely above normal.  Patient's urinalysis is negative for infection. Patient was given IV fluid in the ED. Patient is asymptomatic and feels well. I did consult with hospitalist who felt that patient can follow-up with nephrologist as outpatient. Patient is on lisinopril which I am stopping. He has ibuprofen listed as a medication but states he is not taking it and I explained to him that he needs to avoid NSAIDs. Patient is on Librium but has been holding it for the past 4 days because of his lab check. I have ordered a Librium level which is pending. Unless it is elevated, the plan is to discharge patient home with outpatient nephrology follow-up and lower his lithium dose. If his lithium level is elevated then the plan is to admit patient. I have signed out patient to Dr. Charles Bueno. We have reviewed patient's presentation, history, physical, PMH, testing so far, treatments so far, and pending testing plus disposition plan. ICD-10-CM    1. Renal insufficiency  N28.9           DISPOSITION         Voice dictation software was used during the making of this note. This software is not perfect and grammatical and other typographical errors may be present. This note has not been completely proofread for errors.      Ernst Melchor MD  11/02/22 9203

## 2022-11-17 NOTE — TELEPHONE ENCOUNTER
Spoke with [address] and he did a home study that was only 2 hours and they needed a 4 hour test. Never followed up/Sent the referral to [facility]ME[facility]

## 2023-01-20 ENCOUNTER — OFFICE VISIT (OUTPATIENT)
Dept: ENT CLINIC | Age: 43
End: 2023-01-20

## 2023-01-20 VITALS — HEIGHT: 72 IN | BODY MASS INDEX: 42.66 KG/M2 | WEIGHT: 315 LBS

## 2023-01-20 DIAGNOSIS — H90.A32 MIXED CONDUCTIVE AND SENSORINEURAL HEARING LOSS OF LEFT EAR WITH RESTRICTED HEARING OF RIGHT EAR: ICD-10-CM

## 2023-01-20 DIAGNOSIS — H65.92 OME (OTITIS MEDIA WITH EFFUSION), LEFT: Primary | ICD-10-CM

## 2023-01-20 DIAGNOSIS — H90.A21 SENSORINEURAL HEARING LOSS (SNHL) OF RIGHT EAR WITH RESTRICTED HEARING OF LEFT EAR: Primary | ICD-10-CM

## 2023-01-20 RX ORDER — LISINOPRIL 40 MG/1
TABLET ORAL
COMMUNITY
Start: 2022-12-16

## 2023-01-20 ASSESSMENT — ENCOUNTER SYMPTOMS
WHEEZING: 0
COLOR CHANGE: 0
DIARRHEA: 0
EYE PAIN: 0
COUGH: 0
VOMITING: 0

## 2023-01-20 NOTE — PROGRESS NOTES
AUDIOLOGY EVALUATION    Manasa Haynes had Tympanometry and Audiometry performed today. The patient reports hearing loss in his left ear. Results as follows:    Tympanometry    Type A -  on right  Type C -  on left    Audiometry    Test Performed - Comprehensive Audiogram    Type of Loss - Right Ear: abnormal hearing: degree of loss is normal to moderate sensorineural hearing loss                           Left Ear: abnormal hearing: degree of loss is mild to moderately severe mixed hearing loss     SRT   Measurement Right Ear Left Ear   Value 20 45   Unit dB dB     Discrimination  Measurement Right Ear Left Ear   Value 92% 92%   Unit dB dB     Recommend  Retest following otologic management    [address], [id number] [facility]  Audiologist

## 2023-01-20 NOTE — PROGRESS NOTES
Chief Complaint   Patient presents with    Ear Problem     Patient states that he has some hearing loss in his left ear and states that he feels like maybe its some wax that's causing it. HPI:  Antwan Murphy is a 38 y.o. male seen today in initial consultation for hearing loss. He reports a couple year history of worsening hearing on the left side. He admits to using Q-tips and wonders if there is a large cerumen plug on that side. There is some intermittent pain and pressure in that ear. His hearing does seem to improve slightly when he leans forward. There is no otorrhea. He has no previous otologic hx. Past Medical History, Past Surgical History, Family history, Social History, and Medications were all reviewed with the patient today and updated as necessary.      No Known Allergies  Patient Active Problem List   Diagnosis    HTN (hypertension)    Schizoaffective disorder, bipolar type ([facility])    PTSD (post-traumatic stress disorder)    YENI (obstructive sleep apnea)    Hypothyroidism (acquired)    DM2 (diabetes mellitus, type 2) ([facility])    TTH (tension-type headache)    OA (osteoarthritis)    HLD (hyperlipidemia)    BMI 60.0-69.9, adult ([facility])     Current Outpatient Medications   Medication Sig    lisinopril (PRINIVIL;ZESTRIL) 40 MG tablet TAKE 1 TABLET BY MOUTH EVERY DAY    metoprolol tartrate (LOPRESSOR) 25 MG tablet Take 1 tablet by mouth 2 times daily    metFORMIN (GLUCOPHAGE) 500 MG tablet Take 1 tablet by mouth daily (with breakfast)    levothyroxine (SYNTHROID) 75 MCG tablet Take 1 tablet by mouth daily    atorvastatin (LIPITOR) 40 MG tablet Take 1 tablet by mouth at bedtime    amLODIPine (NORVASC) 10 MG tablet Take 1 tablet by mouth daily    tiZANidine (ZANAFLEX) 2 MG tablet Take 1 tablet by mouth 2 times daily    citalopram (CELEXA) 40 MG tablet Take 40 mg by mouth daily    NALTREXONE-BUPROPION HCL ER PO Take by mouth in the morning, at noon, and at bedtime Unknown mg (Patient not taking: Reported on 1/20/2023)    OLANZapine (ZYPREXA) 10 MG tablet Take 10 mg by mouth nightly (Patient not taking: Reported on 1/20/2023)    Acetaminophen 500 MG CAPS Take 1,500 mg by mouth (Patient not taking: Reported on 1/20/2023)    loratadine (CLARITIN) 10 MG tablet Take 10 mg by mouth daily (Patient not taking: Reported on 1/20/2023)    lithium 300 MG capsule Take 600 mg by mouth 2 times daily (with meals) Take two capsules two times a day (Patient not taking: Reported on 1/20/2023)     No current facility-administered medications for this visit. Past Medical History:   Diagnosis Date    Diabetes mellitus ([facility] [address]     Hyperlipidemia     Hypertension     Schizo-affective psychosis ([facility] [address]     with major depression    Thyroid disease      Social History     Tobacco Use    Smoking status: Former     Years: 15.00     Types: Cigarettes    Smokeless tobacco: Never   Substance Use Topics    Alcohol use: Not Currently     Past Surgical History:   Procedure Laterality Date    ANKLE SURGERY Left 2014    4 left ankle surgeries, ankle fusion     Family History   Problem Relation Age of Onset    High Blood Pressure Mother     Alcohol Abuse Father         ROS:    Review of Systems   Constitutional:  Negative for chills. HENT:  Positive for hearing loss. Negative for ear discharge and ear pain. Eyes:  Negative for pain. Respiratory:  Negative for cough and wheezing. Cardiovascular:  Negative for chest pain and palpitations. Gastrointestinal:  Negative for diarrhea and vomiting. Skin:  Negative for color change and wound. Allergic/Immunologic: Negative for environmental allergies. Neurological:  Negative for dizziness and headaches. PHYSICAL EXAM:    Ht 6' (1.829 m)   Wt (!) 504 lb (228.6 kg)   BMI 68.35 kg/m²     General: NAD, well-appearing  Neuro: No gross neuro deficits. CN's II-XII intact. No facial weakness. Eyes: EOMI. Pupils reactive. No periorbital edema/ecchymosis. No nystagmus.   Skin: No facial erythema, rashes or concerning lesions.  Nose: No external deviations or saddling. Intranasally, septum is midline without perforations, nasal mucosa appears healthy with no erythema, mucopurulence, or polyps.  Mouth: Moist mucus membranes, normal tongue/palate mobility, no concerning mucosal lesions. Oropharynx clear with no erythema/exudate, no tonsillar hypertrophy.  Ears: Normal appearing auricles, no hematomas. EACs clear with no cerumen impaction, healthy canal skin, thickened and dulled L TM w/ likely effusion, R ME space is clear.  Neck: Soft, supple, no palpable lateral neck masses. No palpable parotid or submandibular masses. No thyromegaly or palpable thyroid nodules. No surgical scars.  Lymphatics: No palpable cervical LAD.  Resp: No audible stridor or wheezing.  CV: No murmurs, no JVD.  Extremities: No clubbing or cyanosis.    PROCEDURE: L myringotomy  INDICATIONS: L OME  DESCRIPTION: Verbal consent obtained. Timeout performed. After cleaning out L EAC, I anesthetized L TM w/ topical Phenol. Next, I used a myringotomy blade to make a radial incision along the anteroinferior quadrant of the TM and then I suctioned out a serous middle ear effusion. There was minimal bleeding and the patient tolerated the procedure well w/ no complications.        ASSESSMENT and PLAN      ICD-10-CM    1. OME (otitis media with effusion), left  H65.92 INCISION EARDRUM,ASPIR        He had L OME and his audio revealed a mainly CHL on L side. I performed a L myringotomy today in the office and his hearing improved dramatically. RTC in 3-4 wks to recheck his ears.     Aly Arambula MD  1/20/2023    Electronically signed by Aly Arambula MD on 1/20/2023 at 3:42 PM

## 2023-03-15 ENCOUNTER — OFFICE VISIT (OUTPATIENT)
Dept: ENT CLINIC | Age: 43
End: 2023-03-15
Payer: MEDICARE

## 2023-03-15 VITALS — BODY MASS INDEX: 42.66 KG/M2 | OXYGEN SATURATION: 95 % | HEIGHT: 72 IN | HEART RATE: 85 BPM | WEIGHT: 315 LBS

## 2023-03-15 DIAGNOSIS — H65.92 OME (OTITIS MEDIA WITH EFFUSION), LEFT: Primary | ICD-10-CM

## 2023-03-15 PROCEDURE — 1036F TOBACCO NON-USER: CPT | Performed by: OTOLARYNGOLOGY

## 2023-03-15 PROCEDURE — G8417 CALC BMI ABV UP PARAM F/U: HCPCS | Performed by: OTOLARYNGOLOGY

## 2023-03-15 PROCEDURE — G8482 FLU IMMUNIZE ORDER/ADMIN: HCPCS | Performed by: OTOLARYNGOLOGY

## 2023-03-15 PROCEDURE — 69433 CREATE EARDRUM OPENING: CPT | Performed by: OTOLARYNGOLOGY

## 2023-03-15 PROCEDURE — G8428 CUR MEDS NOT DOCUMENT: HCPCS | Performed by: OTOLARYNGOLOGY

## 2023-03-15 PROCEDURE — 99213 OFFICE O/P EST LOW 20 MIN: CPT | Performed by: OTOLARYNGOLOGY

## 2023-03-15 ASSESSMENT — ENCOUNTER SYMPTOMS
COLOR CHANGE: 0
DIARRHEA: 0
COUGH: 0
VOMITING: 0
WHEEZING: 0
EYE PAIN: 0

## 2023-03-15 NOTE — PROGRESS NOTES
Chief Complaint   Patient presents with    Follow-up     Patient is here for follow up and states that he is here for a tube placement in the left ear. HPI:  Ishan Yepez is a 38 y.o. male seen in follow-up for for his ears. I had seen about 1 month ago for left-sided hearing loss. His audiogram revealed a moderate conductive hearing loss and he had a serous effusion present. I performed an in office myringotomy and his hearing improved dramatically afterwards. He had some drainage from that left ear for several days afterwards but his hearing remained stable until out 5 days afterwards. Since then, his left ear has remained stopped up with muffled hearing. There is no further drainage, and he denies any otalgia. No symptoms on the right side. Past Medical History, Past Surgical History, Family history, Social History, and Medications were all reviewed with the patient today and updated as necessary.      No Known Allergies  Patient Active Problem List   Diagnosis    HTN (hypertension)    Schizoaffective disorder, bipolar type ([facility] [address]    PTSD (post-traumatic stress disorder)    YENI (obstructive sleep apnea)    Hypothyroidism (acquired)    DM2 (diabetes mellitus, type 2) ([facility])    TTH (tension-type headache)    OA (osteoarthritis)    HLD (hyperlipidemia)    BMI 60.0-69.9, adult ([facility])     Current Outpatient Medications   Medication Sig    lisinopril (PRINIVIL;ZESTRIL) 40 MG tablet TAKE 1 TABLET BY MOUTH EVERY DAY    NALTREXONE-BUPROPION HCL ER PO Take by mouth in the morning, at noon, and at bedtime Unknown mg    metoprolol tartrate (LOPRESSOR) 25 MG tablet Take 1 tablet by mouth 2 times daily    metFORMIN (GLUCOPHAGE) 500 MG tablet Take 1 tablet by mouth daily (with breakfast)    levothyroxine (SYNTHROID) 75 MCG tablet Take 1 tablet by mouth daily    atorvastatin (LIPITOR) 40 MG tablet Take 1 tablet by mouth at bedtime    amLODIPine (NORVASC) 10 MG tablet Take 1 tablet by mouth daily    tiZANidine (ZANAFLEX) 2 MG tablet Take 1 tablet by mouth 2 times daily    OLANZapine (ZYPREXA) 10 MG tablet Take 10 mg by mouth nightly    Acetaminophen 500 MG CAPS Take 1,500 mg by mouth    citalopram (CELEXA) 40 MG tablet Take 40 mg by mouth daily    loratadine (CLARITIN) 10 MG tablet Take 10 mg by mouth daily    lithium 300 MG capsule Take 600 mg by mouth 2 times daily (with meals) Take two capsules two times a day     No current facility-administered medications for this visit. Past Medical History:   Diagnosis Date    Diabetes mellitus ([facility] [address]     Hyperlipidemia     Hypertension     Schizo-affective psychosis ([facility] [address]     with major depression    Thyroid disease      Social History     Tobacco Use    Smoking status: Former     Years: 15.00     Types: Cigarettes    Smokeless tobacco: Never   Substance Use Topics    Alcohol use: Not Currently     Past Surgical History:   Procedure Laterality Date    ANKLE SURGERY Left 2014    4 left ankle surgeries, ankle fusion     Family History   Problem Relation Age of Onset    High Blood Pressure Mother     Alcohol Abuse Father         ROS:    Review of Systems   Constitutional:  Negative for chills. HENT:  Positive for hearing loss. Eyes:  Negative for pain. Respiratory:  Negative for cough and wheezing. Cardiovascular:  Negative for chest pain and palpitations. Gastrointestinal:  Negative for diarrhea and vomiting. Skin:  Negative for color change and wound. Allergic/Immunologic: Negative for environmental allergies. Neurological:  Negative for dizziness and headaches. PHYSICAL EXAM:    Pulse 85   Ht 6' (1.829 m)   Wt (!) 504 lb (228.6 kg)   SpO2 95%   BMI 68.35 kg/m²     General: NAD, well-appearing  Neuro: No gross neuro deficits. No facial weakness. Eyes: No periorbital edema/ecchymosis. No nystagmus. Skin: No facial erythema, rashes or concerning lesions. Nose: No external deviations or saddling.  Intranasally, septum is midline without perforations, nasal mucosa appears healthy with no erythema, mucopurulence, or polyps. Mouth: Moist mucus membranes, normal tongue/palate mobility, no concerning mucosal lesions. Oropharynx clear with no erythema/exudate, no tonsillar hypertrophy. Ears: Normal appearing auricles, no hematomas. EACs clear with no cerumen impaction, healthy canal skin, normal-appearing right TM with clear middle ear space. Left side-TM is thickened and dulled with obvious serous ROLY. Neck: Soft, supple, no palpable neck masses. No palpable parotid or submandibular masses. No thyromegaly or palpable thyroid nodules. Lymphatics: No palpable cervical LAD. Resp: No audible stridor or wheezing. CV: No murmurs, no JVD. Extremities: No clubbing or cyanosis. PROCEDURE: L myringotomy w/ placement of tympanostomy tube  INDICATIONS: L OME  DESCRIPTION: Verbal consent obtained. Timeout performed. After cleaning out L EAC, I anesthetized L TM w/ topical Phenol. Next, I used a myringotomy blade to make a radial incision along the anteroinferior quadrant of the TM and after suctioning out a serous middle ear effusion, a Evie-Bobbin tube was placed without difficulty. There was minimal bleeding and the patient tolerated the procedure well w/ no complications. ASSESSMENT and PLAN      ICD-10-CM    1. OME (otitis media with effusion), left  H65.92 CREATE EARDRUM OPENING,LOCAL ANESTH        He reaccumulated a left middle ear effusion after his previous myringotomy, and I placed a [employer] tube in his left ear today. His hearing improved dramatically afterwards I will have him follow-up with our PA Raulito Mar in 1 month for a tube check.     Leno Olmedo MD  3/15/2023    Electronically signed by Leno Olmedo MD on 3/15/2023 at 6:42 PM

## 2023-04-20 ENCOUNTER — OFFICE VISIT (OUTPATIENT)
Dept: ENT CLINIC | Age: 43
End: 2023-04-20

## 2023-04-20 VITALS
DIASTOLIC BLOOD PRESSURE: 68 MMHG | SYSTOLIC BLOOD PRESSURE: 110 MMHG | BODY MASS INDEX: 42.66 KG/M2 | HEIGHT: 72 IN | WEIGHT: 315 LBS

## 2023-04-20 DIAGNOSIS — H65.92 OME (OTITIS MEDIA WITH EFFUSION), LEFT: Primary | ICD-10-CM

## 2023-04-20 DIAGNOSIS — H90.A21 SENSORINEURAL HEARING LOSS (SNHL) OF RIGHT EAR WITH RESTRICTED HEARING OF LEFT EAR: ICD-10-CM

## 2023-04-20 ASSESSMENT — ENCOUNTER SYMPTOMS
COUGH: 0
EYE DISCHARGE: 0
ABDOMINAL PAIN: 0

## 2023-04-20 NOTE — PROGRESS NOTES
present. External Ears: appearance is normal with no scars, lesions or masses. Right Ear:  Canals is normal, Tympanic membrane with normal landmarks and normal mobility, no retraction, inflammation, effusion. Left  Ear: Canal is normal, Tympanic membrane with normal landmarks and normal mobility, no retraction, inflammation, effusion. Tube securely placed and widely patent. Nose/Nasal Cavity: Nasal mucosa is red/ inflamed. Nasal septum is midline. Inferior turbinates are Hypertrophic. Both nasal passages are clear, no polyps or abnormal drainage. Lips/Gums/Teeth: Inspection of lips, gums and teeth are normal  Oral Cavity: normal oral mucosa, no visualized ulcers, masses or other lesions,  Palate normal, Tongue normal, Floor of mouth normal. Mallampati Class 2 . Oropharynx: Oropharynx normal and unobstructed,no lesion or inflammation. Neck/Thyroid: Normal appearance without mass, Trachea midline, No lymphadenopathy, No enlargement, tenderness or mass of thyroid noted. Procedure: N/A    Assessment/Plan:  1. OME (otitis media with effusion), left  -     ofloxacin (FLOXIN) 0.3 % otic solution; Place 5 drops into both ears 2 times daily for 7 days, Disp-10 mL, R-0Normal  2. Body mass index (BMI) 60.0-69.9, adult ([facility] [address]  3. Sensorineural hearing loss (SNHL) of right ear with restricted hearing of left ear    Patient is doing very well status post tube placement. Sometimes his ears have a little bit of otorrhea but cleared up with Ocuflox. We will recommend vinegar rinses prior to Ocuflox if persistent. Return in about 6 months (around 10/20/2023) for Ear tube recheck. Patient agrees with this plan. Pita Ellington, PA    This note was generated using voice recognition software, please excuse any typos.

## 2023-04-21 RX ORDER — OFLOXACIN 3 MG/ML
5 SOLUTION AURICULAR (OTIC) 2 TIMES DAILY
Qty: 10 ML | Refills: 0 | Status: SHIPPED | OUTPATIENT
Start: 2023-04-21 | End: 2023-04-28